# Patient Record
Sex: FEMALE | Race: WHITE
[De-identification: names, ages, dates, MRNs, and addresses within clinical notes are randomized per-mention and may not be internally consistent; named-entity substitution may affect disease eponyms.]

---

## 2019-01-14 ENCOUNTER — HOSPITAL ENCOUNTER (OUTPATIENT)
Dept: HOSPITAL 97 - ER | Age: 79
Setting detail: OBSERVATION
LOS: 2 days | Discharge: HOME | End: 2019-01-16
Attending: FAMILY MEDICINE | Admitting: FAMILY MEDICINE
Payer: COMMERCIAL

## 2019-01-14 DIAGNOSIS — E03.9: ICD-10-CM

## 2019-01-14 DIAGNOSIS — K43.9: ICD-10-CM

## 2019-01-14 DIAGNOSIS — I10: ICD-10-CM

## 2019-01-14 DIAGNOSIS — Z79.82: ICD-10-CM

## 2019-01-14 DIAGNOSIS — E78.5: ICD-10-CM

## 2019-01-14 DIAGNOSIS — R11.2: Primary | ICD-10-CM

## 2019-01-14 DIAGNOSIS — H91.90: ICD-10-CM

## 2019-01-14 DIAGNOSIS — E66.01: ICD-10-CM

## 2019-01-14 LAB
ALBUMIN SERPL BCP-MCNC: 3.2 G/DL (ref 3.4–5)
ALP SERPL-CCNC: 82 U/L (ref 45–117)
ALT SERPL W P-5'-P-CCNC: 17 U/L (ref 12–78)
ANISOCYTOSIS BLD QL: (no result)
AST SERPL W P-5'-P-CCNC: 14 U/L (ref 15–37)
BUN BLD-MCNC: 18 MG/DL (ref 7–18)
GLUCOSE SERPLBLD-MCNC: 136 MG/DL (ref 74–106)
HCT VFR BLD CALC: 30.5 % (ref 36–45)
HCT VFR BLD CALC: 33.4 % (ref 36–45)
HCT VFR BLD CALC: 35 % (ref 36–45)
HYPOCHROMIA BLD QL: (no result)
INR BLD: 1.08
LIPASE SERPL-CCNC: 250 U/L (ref 73–393)
LYMPHOCYTES # SPEC AUTO: 0.5 K/UL (ref 0.7–4.9)
MAGNESIUM SERPL-MCNC: 2.5 MG/DL (ref 1.8–2.4)
MORPHOLOGY BLD-IMP: (no result)
NT-PROBNP SERPL-MCNC: 97 PG/ML (ref ?–450)
OVALOCYTES BLD QL SMEAR: (no result)
PMV BLD: 8 FL (ref 7.6–11.3)
POTASSIUM SERPL-SCNC: 4.1 MMOL/L (ref 3.5–5.1)
RBC # BLD: 4.78 M/UL (ref 3.86–4.86)
TROPONIN (EMERG DEPT USE ONLY): < 0.02 NG/ML (ref 0–0.04)

## 2019-01-14 PROCEDURE — 51702 INSERT TEMP BLADDER CATH: CPT

## 2019-01-14 PROCEDURE — 81003 URINALYSIS AUTO W/O SCOPE: CPT

## 2019-01-14 PROCEDURE — 85025 COMPLETE CBC W/AUTO DIFF WBC: CPT

## 2019-01-14 PROCEDURE — 93005 ELECTROCARDIOGRAM TRACING: CPT

## 2019-01-14 PROCEDURE — 36415 COLL VENOUS BLD VENIPUNCTURE: CPT

## 2019-01-14 PROCEDURE — 94760 N-INVAS EAR/PLS OXIMETRY 1: CPT

## 2019-01-14 PROCEDURE — 99285 EMERGENCY DEPT VISIT HI MDM: CPT

## 2019-01-14 PROCEDURE — 96366 THER/PROPH/DIAG IV INF ADDON: CPT

## 2019-01-14 PROCEDURE — 85018 HEMOGLOBIN: CPT

## 2019-01-14 PROCEDURE — 85610 PROTHROMBIN TIME: CPT

## 2019-01-14 PROCEDURE — 83690 ASSAY OF LIPASE: CPT

## 2019-01-14 PROCEDURE — 80076 HEPATIC FUNCTION PANEL: CPT

## 2019-01-14 PROCEDURE — 83735 ASSAY OF MAGNESIUM: CPT

## 2019-01-14 PROCEDURE — 74176 CT ABD & PELVIS W/O CONTRAST: CPT

## 2019-01-14 PROCEDURE — 85014 HEMATOCRIT: CPT

## 2019-01-14 PROCEDURE — 84484 ASSAY OF TROPONIN QUANT: CPT

## 2019-01-14 PROCEDURE — 96365 THER/PROPH/DIAG IV INF INIT: CPT

## 2019-01-14 PROCEDURE — 96375 TX/PRO/DX INJ NEW DRUG ADDON: CPT

## 2019-01-14 PROCEDURE — 87088 URINE BACTERIA CULTURE: CPT

## 2019-01-14 PROCEDURE — 83880 ASSAY OF NATRIURETIC PEPTIDE: CPT

## 2019-01-14 PROCEDURE — 87086 URINE CULTURE/COLONY COUNT: CPT

## 2019-01-14 PROCEDURE — 71045 X-RAY EXAM CHEST 1 VIEW: CPT

## 2019-01-14 PROCEDURE — 80048 BASIC METABOLIC PNL TOTAL CA: CPT

## 2019-01-14 PROCEDURE — 96361 HYDRATE IV INFUSION ADD-ON: CPT

## 2019-01-14 RX ADMIN — DEXTROSE AND SODIUM CHLORIDE SCH MLS: 5; .45 INJECTION, SOLUTION INTRAVENOUS at 18:01

## 2019-01-14 RX ADMIN — Medication SCH ML: at 20:36

## 2019-01-14 RX ADMIN — DEXTROSE AND SODIUM CHLORIDE SCH: 5; .45 INJECTION, SOLUTION INTRAVENOUS at 17:00

## 2019-01-14 RX ADMIN — SODIUM CHLORIDE SCH: 0.9 INJECTION, SOLUTION INTRAVENOUS at 15:00

## 2019-01-14 NOTE — RAD REPORT
EXAM DESCRIPTION:  CT - Abdomen   Pelvis Wo Contrast - 1/14/2019 3:01 pm

 

CLINICAL HISTORY:  Abdominal  pain vomiting

 

COMPARISON:  2010

 

TECHNIQUE:  Computed axial tomography of the abdomen and pelvis was obtained. IV and oral contrast we
re not requested.

 

All CT scans are performed using dose optimization technique as appropriate and may include automated
 exposure control or mA/KV adjustment according to patient size.

 

FINDINGS:   The evaluation of solid organs, vessels and bowel is limited secondary to the lack of con
trast administration.

 

A very large ventral hernia contains stomach, large and small bowel and proximal pancreas. The neck m
easures centimeters.

 

Remainder of the liver, spleen, pancreas and kidneys appear grossly normal. Small adrenal adenomas ar
e present. There is no evidence of diverticulitis.

 

A Davenport catheter is present within the bladder. Rectum is mildly distended with stool.

.

Gallstones are present.

 

IMPRESSION:  Very large ventral hernia

 

Cholelithiasis

## 2019-01-14 NOTE — EKG
Test Date:    2019-01-14               Test Time:    12:58:09

Technician:   CATHY                                     

                                                     

MEASUREMENT RESULTS:                                       

Intervals:                                           

Rate:         73                                     

IA:           212                                    

QRSD:         142                                    

QT:           438                                    

QTc:          482                                    

Axis:                                                

P:            78                                     

IA:           212                                    

QRS:          34                                     

T:            -2                                     

                                                     

INTERPRETIVE STATEMENTS:                                       

                                                     

Sinus rhythm with 1st degree AV block

Right bundle branch block

Abnormal ECG

Compared to ECG 06/04/2010 09:15:00

First degree AV block now present

T-wave abnormality no longer present

Possible ischemia no longer present



Electronically Signed On 01-14-19 15:05:50 CST by Shady Cabral

## 2019-01-14 NOTE — ER
Nurse's Notes                                                                                     

 [facility]                                                                

Name: Sindhu Rai                                                                              

Age: 78 yrs                                                                                       

Sex: Female                                                                                       

: 1940                                                                                   

MRN: [id number]                                                                                   

Arrival Date: 2019                                                                          

Time: 12:18                                                                                       

Account#: [id number]                                                                            

Bed 7                                                                                             

Private MD:                                                                                       

Diagnosis: Gastrointestinal hemorrhage, unspecified;Vomiting;Ventral hernia;Anemia,               

  unspecified;Obesity, unspecified;Cholelithiasis                                                 

                                                                                                  

Presentation:                                                                                     

                                                                                             

12:19 Presenting complaint: EMS states: N/V for a couple days, vomited 4 times today with     jl7 

      brownish/pink emesis. Transition of care: patient was received from another setting of      

      care (long-[facility] care [facility]), [facility]. Onset of symptoms was 2019. Risk Assessment: Do you want to hurt yourself or someone else? Patient            

      reports no desire to harm self or others. Initial Sepsis Screen: Does the patient meet      

      any 2 criteria? No. Patient's initial sepsis screen is negative. Does the patient have      

      a suspected source of infection? No. Patient's initial sepsis screen is negative. Care      

      prior to arrival: None.                                                                     

12:19 Method Of Arrival: EMS: [facility]                                                       jl7 

12:19 Acuity: JANIE 3                                                                           jl7 

                                                                                                  

Triage Assessment:                                                                                

12:32 General: Appears in no apparent distress. uncomfortable, Behavior is calm, cooperative, jl7 

      appropriate for age. Pain: Complains of pain in diaphragm Pain does not radiate. Pain       

      currently is 2 out of 10 on a pain scale. Quality of pain is described as "Sore" Is         

      continuous. EENT: No signs and/or symptoms were reported regarding the EENT system.         

      Neuro: Level of Consciousness is awake, alert, obeys commands, Oriented to person,          

      place, time, situation. Cardiovascular: Heart tones S1 S2 present. Respiratory: Airway      

      is patent Respiratory effort is even, unlabored, Respiratory pattern is regular,            

      symmetrical, Breath sounds are clear bilaterally. GI: Reports nausea, vomiting, Patient     

      currently denies diarrhea. : No signs and/or symptoms were reported regarding the         

      genitourinary system. Derm: Skin is pink, warm \T\ dry. Musculoskeletal: No signs and/or    

      symptoms reported regarding the musculoskeletal system.                                     

                                                                                                  

Historical:                                                                                       

- Allergies:                                                                                      

12:32 Codeine;                                                                                jl7 

12:32 Potato;                                                                                 jl7 

12:32 Rice;                                                                                   jl7 

- Home Meds:                                                                                      

12:32 aspirin 81 mg Oral TbEC 1 tab once daily [Active]; docusate sodium 100 mg Oral cap 1    jl7 

      cap 2 times per day [Active]; furosemide 20 mg Oral tab 1 tab 2 times per day [Active];     

      ipratropium bromide inhalation inhalation [Active]; Klor-Con M20 20 mEq Oral TbTQ 1 tab     

      once daily [Active]; levothyroxine 25 mcg tab 1 tab once daily [Active]; magnesium          

      oxide 400 mg Oral tab [Active]; metoclopramide HCl 10 mg Oral tab 1 tab 4 times per day     

      [Active]; metoprolol tartrate 25 mg Oral tab 1 tab once daily [Active]; omeprazole 40       

      mg Oral cpDR 1 cap once daily [Active]; Phenergan 25 mg Rectal supp [Active]; Phenergan     

      Oral [Active]; quetiapine 50 mg oral tab [Active]; sennosides 8.6 mg oral tab 1 tabs        

      BID [Active]; simvastatin 40 mg Oral tab 1 tab once daily [Active]; spironolactone 25       

      mg Oral tab 1 tab once daily [Active]; sucralfate 1 gram Oral tab 1 tab 2 times per day     

      [Active]; Vitamin D Oral [Active];                                                          

- PMHx:                                                                                           

12:32 psychosis; Hypothyroidism; Hyperlipidemia; Hypertension; Ventral Hernia;                jl7 

                                                                                                  

- Immunization history:: Adult Immunizations up to date.                                          

- Social history:: Smoking status: Patient/guardian denies using tobacco.                         

- Ebola Screening: : No symptoms or risks identified at this time.                                

- Family history:: not pertinent.                                                                 

                                                                                                  

                                                                                                  

Screenin:30 Abuse screen: Denies threats or abuse. Denies injuries from another. Nutritional        jl7 

      screening: No deficits noted. Tuberculosis screening: No symptoms or risk factors           

      identified.                                                                                 

12:30 Fall Risk No fall in past 12 months (0 pts). No secondary diagnosis (0 pts). IV access  jl7 

      (20 points). Ambulatory Aid- Crutches/Cane/Walker (15 pts). Gait- Weak (10 pts.).           

      Mental Status- Oriented to own ability (0 pts). Total Aguero Fall Scale indicates Low        

      Risk Score (25-44 pts). Fall prevention measures have been instituted. Side Rails Up X      

      2 Placed close to Nursing Station Frequent Obs/Assesments occuring As available Patient     

      and Family Educated on Fall Prevention Program and strategies.                              

                                                                                                  

Assessment:                                                                                       

12:30 General: See triage assessment. GI: Pt reports "I haven't even vomited since they       jl7 

      picked me up to bring me here.".                                                            

13:30 Reassessment: Patient appears in no apparent distress at this time. No changes from     [facility] 

      previously documented assessment. Patient and/or family updated on plan of care and         

      expected duration. Pain level reassessed. Patient is alert, oriented x 3, equal             

      unlabored respirations, skin warm/dry/pink.                                                 

14:25 Reassessment: Dr. Mendes at bedside discussing plan of care.                          jl7 

14:30 Reassessment: Patient appears in no apparent distress at this time. Patient and/or      jl7 

      family updated on plan of care and expected duration. Pain level reassessed. Patient is     

      alert, oriented x 3, equal unlabored respirations, skin warm/dry/pink.                      

15:15 Reassessment: Dr. Lofton at bedside discussing plan of care. Gastro-occult test sent to  [facility] 

      lab at this time.                                                                           

                                                                                                  

Vital Signs:                                                                                      

12:19  / 95; Pulse 75; Resp 18 S; Temp 98.8(O); Pulse Ox 97% on R/A; Weight 149.69 kg   jl7 

      (R); Height 5 ft. 3 in. (160.02 cm) (R); Pain 2/10;                                         

13:00  / 97; Pulse 75; Resp 16; Pulse Ox 98% on R/A;                                    jl7 

14:00  / 77; Pulse 76; Resp 16 S; Pulse Ox 96% on R/A;                                  jl7 

15:00  / 76; Pulse 75; Resp 18 S; Pulse Ox 96% on R/A;                                  jl7 

15:33  / 86; Pulse 80; Resp 16 S; Pulse Ox 96% on R/A;                                  jl7 

17:12  / 82; Pulse 78; Resp 16 S; Pulse Ox 99% on R/A;                                  jl7 

12:19 Body Mass Index 58.46 (149.69 kg, 160.02 cm)                                            [facility] 

                                                                                                  

ED Course:                                                                                        

12:18 Patient arrived in ED.                                                                  jl7 

12:19 Arm band placed on right wrist.                                                         jl7 

12:20 Triage completed.                                                                       jl7 

12:30 Patient has correct armband on for positive identification. Placed in gown. Bed in low  jl7 

      position. Call light in reach. Side rails up X2. Cardiac monitor on. Pulse ox on. NIBP      

      on. Warm blanket given.                                                                     

12:35 Jesus Mendes MD is Attending Physician.                                             [facility] 

12:55 Davenport cath inserted, using sterile technique, 16 Fr., by me, balloon inflated, to       jl7 

      gravity drainage, urine specimen collected. Missed attempt(s): 22 gauge in left             

      forearm. Bleeding controlled, band aid applied, catheter tip intact.                        

13:05 EKG done, by EKG tech. reviewed by Jesus Mendes MD.                                   sm3 

13:10 Missed attempt(s): 22 gauge in right forearm. Bleeding controlled, band aid applied,    jl7 

      catheter tip intact.                                                                        

13:19 Missed attempt(s): 24 gauge in right hand. Bleeding controlled, band aid applied,       jl7 

      catheter tip intact.                                                                        

13:20 Initial lab(s) drawn, by me, sent to lab. Inserted saline lock: 20 gauge in right       aa5 

      antecubital area, using aseptic technique. Blood collected.                                 

13:27 Anton, Jahala, RN is Primary Nurse.                                                      [facility] 

13:36 X-ray completed. Portable x-ray completed in exam room. Patient tolerated procedure     jb2 

      well.                                                                                       

13:41 XRAY Chest (1 view) In Process Unspecified.                                             EDMS

14:48 Dawood Lofton MD is Hospitalizing Provider.                                            [facility] 

15:00 CT completed. Patient tolerated procedure well. Patient moved to CT via stretcher.      jg6 

      Patient moved back from CT.                                                                 

15:02 CT Abd/Pelvis - Without Cont In Process Unspecified.                                    EDMS

17:16 No provider procedures requiring assistance completed. Patient admitted, IV remains in  jl7 

      place. intact, No redness/swelling at site.                                                 

                                                                                                  

Administered Medications:                                                                         

13:20 Drug: NS 0.9% 1000 ml Route: IV; Rate: 125 ml/hr; Site: right antecubital;              ca1 

17:13 Follow up: IV Status: Completed infusion; Infusion continued upon admission             jl7 

13:28 Drug: Zofran 4 mg Route: IVP; Site: right antecubital;                                  ca1 

13:45 Follow up: Response: No adverse reaction; Nausea is decreased                           jl7 

14:56 Drug: ProTONIX 80 mg Route: IVP; Site: right antecubital;                               jl7 

15:28 Follow up: Response: No adverse reaction                                                7 

15:20 Drug: ProTONIX 8 mg/hr Route: IV; Rate: 25 ml/hr; Site: right antecubital;              jl7 

17:13 Follow up: IV Status: Completed infusion; Infusion continued upon admission             jl7 

                                                                                                  

                                                                                                  

Outcome:                                                                                          

14:51 Decision to Hospitalize by Provider.                                                    gianni 

17:15 Admitted to Med/surg accompanied by tech, family with patient, via stretcher, room 232, jl7 

      with chart, Report called to  NAOMIE Ward                                                   

17:15 Condition: stable                                                                           

17:15 Discharge instructions given to patient, family, Instructed on the need for admit,          

      Demonstrated understanding of instructions.                                                 

17:16 Patient left the ED.                                                                    jl7 

                                                                                                  

Signatures:                                                                                       

Dispatcher [employer]                           EDJesus Vo MD MD cha Buechter, Jesse jb2 Calderon, Audri, RN                     RN   aa5                                                  

Rafaela Anton RN                        RN   jl7                                                  

June Wu Jessica jg6                                                  

Angie Hinojosa RN                        RN   ca1                                                  

                                                                                                  

**************************************************************************************************

## 2019-01-14 NOTE — RAD REPORT
EXAM DESCRIPTION:  Yaima Single View1/14/2019 1:39 pm

 

CLINICAL HISTORY:  Cough

 

COMPARISON:  none

 

FINDINGS:   The lungs appear clear of acute infiltrate. The heart is mildly to moderately enlarged

 

IMPRESSION:   No acute abnormalities displayed

## 2019-01-14 NOTE — EDPHYS
Physician Documentation                                                                           

 [facility]                                                                

Name: Sindhu Rai                                                                              

Age: 78 yrs                                                                                       

Sex: Female                                                                                       

: 1940                                                                                   

MRN: [id number]                                                                                   

Arrival Date: 2019                                                                          

Time: 12:18                                                                                       

Account#: [id number]                                                                            

Bed 7                                                                                             

Private MD:                                                                                       

ED Physician Jesus Mendes                                                                      

HPI:                                                                                              

                                                                                             

14:40 This 78 yrs old  Female presents to ER via EMS with complaints of              gianni 

      Nausea/Vomiting.                                                                            

14:40 The patient presents to the emergency department with nausea, vomiting, that is         gianni 

      continuous. Onset: The symptoms/episode began/occurred 2 day(s) ago. Possible causes:       

      unknown. The symptoms are aggravated by nothing. The symptoms are alleviated by             

      nothing. Associated signs and symptoms: The patient has no apparent associated signs or     

      symptoms. Severity of symptoms: At their worst the symptoms were mild moderate in the       

      emergency department the symptoms are unchanged. The patient has not experienced            

      similar symptoms in the past.                                                               

                                                                                                  

Historical:                                                                                       

- Allergies:                                                                                      

12:32 Codeine;                                                                                jl7 

12:32 Potato;                                                                                 jl7 

12:32 Rice;                                                                                   jl7 

- Home Meds:                                                                                      

12:32 aspirin 81 mg Oral TbEC 1 tab once daily [Active]; docusate sodium 100 mg Oral cap 1    jl7 

      cap 2 times per day [Active]; furosemide 20 mg Oral tab 1 tab 2 times per day [Active];     

      ipratropium bromide inhalation inhalation [Active]; Klor-Con M20 20 mEq Oral TbTQ 1 tab     

      once daily [Active]; levothyroxine 25 mcg tab 1 tab once daily [Active]; magnesium          

      oxide 400 mg Oral tab [Active]; metoclopramide HCl 10 mg Oral tab 1 tab 4 times per day     

      [Active]; metoprolol tartrate 25 mg Oral tab 1 tab once daily [Active]; omeprazole 40       

      mg Oral cpDR 1 cap once daily [Active]; Phenergan 25 mg Rectal supp [Active]; Phenergan     

      Oral [Active]; quetiapine 50 mg oral tab [Active]; sennosides 8.6 mg oral tab 1 tabs        

      BID [Active]; simvastatin 40 mg Oral tab 1 tab once daily [Active]; spironolactone 25       

      mg Oral tab 1 tab once daily [Active]; sucralfate 1 gram Oral tab 1 tab 2 times per day     

      [Active]; Vitamin D Oral [Active];                                                          

- PMHx:                                                                                           

12:32 psychosis; Hypothyroidism; Hyperlipidemia; Hypertension; Ventral Hernia;                jl7 

                                                                                                  

- Immunization history:: Adult Immunizations up to date.                                          

- Social history:: Smoking status: Patient/guardian denies using tobacco.                         

- Ebola Screening: : No symptoms or risks identified at this time.                                

- Family history:: not pertinent.                                                                 

                                                                                                  

                                                                                                  

ROS:                                                                                              

14:40 Constitutional: Negative for fever, chills, and weight loss, Eyes: Negative for injury, gianni 

      pain, redness, and discharge, ENT: Negative for injury, pain, and discharge, Neck:          

      Negative for injury, pain, and swelling, Cardiovascular: Negative for chest pain,           

      palpitations, and edema, Respiratory: Negative for shortness of breath, cough,              

      wheezing, and pleuritic chest pain, Back: Negative for injury and pain, : Negative        

      for injury, bleeding, discharge, and swelling, MS/Extremity: Negative for injury and        

      deformity, Skin: Negative for injury, rash, and discoloration, Neuro: Negative for          

      headache, weakness, numbness, tingling, and seizure, Psych: Negative for depression,        

      anxiety, suicide ideation, homicidal ideation, and hallucinations, Allergy/Immunology:      

      Negative for hives, rash, and allergies, Endocrine: Negative for neck swelling,             

      polydipsia, polyuria, polyphagia, and marked weight changes, Hematologic/Lymphatic:         

      Negative for swollen nodes, abnormal bleeding, and unusual bruising.                        

14:40 Abdomen/GI: Positive for nausea, vomiting, coffee emesis.                                   

                                                                                                  

Exam:                                                                                             

14:40 Constitutional:  This is a well developed, well nourished patient who is awake, alert,  gianni 

      and in no acute distress. Head/Face:  Normocephalic, atraumatic. Eyes:  Pupils equal        

      round and reactive to light, extra-ocular motions intact.  Lids and lashes normal.          

      Conjunctiva and sclera are non-icteric and not injected.  Cornea within normal limits.      

      Periorbital areas with no swelling, redness, or edema. ENT:  Nares patent. No nasal         

      discharge, no septal abnormalities noted.  Tympanic membranes are normal and external       

      auditory canals are clear.  Oropharynx with no redness, swelling, or masses, exudates,      

      or evidence of obstruction, uvula midline.  Mucous membranes moist. Neck:  Trachea          

      midline, no thyromegaly or masses palpated, and no cervical lymphadenopathy.  Supple,       

      full range of motion without nuchal rigidity, or vertebral point tenderness.  No            

      Meningismus. Chest/axilla:  Normal chest wall appearance and motion.  Nontender with no     

      deformity.  No lesions are appreciated. Cardiovascular:  Regular rate and rhythm with a     

      normal S1 and S2.  No gallops, murmurs, or rubs.  Normal PMI, no JVD.  No pulse             

      deficits. Respiratory:  Lungs have equal breath sounds bilaterally, clear to                

      auscultation and percussion.  No rales, rhonchi or wheezes noted.  No increased work of     

      breathing, no retractions or nasal flaring. Back:  No spinal tenderness.  No                

      costovertebral tenderness.  Full range of motion. Female :  Normal external               

      genitalia. MS/ Extremity:  Pulses equal, no cyanosis.  Neurovascular intact.  Full,         

      normal range of motion. Neuro:  Awake and alert, GCS 15, oriented to person, place,         

      time, and situation.  Cranial nerves II-XII grossly intact.  Motor strength 5/5 in all      

      extremities.  Sensory grossly intact.  Cerebellar exam normal.  Normal gait. Psych:         

      Awake, alert, with orientation to person, place and time.  Behavior, mood, and affect       

      are within normal limits.                                                                   

14:40 Abdomen/GI: Bowel sounds: normal, Palpation: mild abdominal tenderness, large hernia,       

      ventral, Liver: no appreciated palpable abnormalities, Hernia: noted in the  epigastric     

      area, paraumbilical area and umbilical area.                                                

                                                                                                  

Vital Signs:                                                                                      

12:19  / 95; Pulse 75; Resp 18 S; Temp 98.8(O); Pulse Ox 97% on R/A; Weight 149.69 kg   7 

      (R); Height 5 ft. 3 in. (160.02 cm) (R); Pain 2/10;                                         

13:00  / 97; Pulse 75; Resp 16; Pulse Ox 98% on R/A;                                    jl7 

14:00  / 77; Pulse 76; Resp 16 S; Pulse Ox 96% on R/A;                                  jl7 

15:00  / 76; Pulse 75; Resp 18 S; Pulse Ox 96% on R/A;                                  jl7 

15:33  / 86; Pulse 80; Resp 16 S; Pulse Ox 96% on R/A;                                  jl7 

17:12  / 82; Pulse 78; Resp 16 S; Pulse Ox 99% on R/A;                                  jl7 

12:19 Body Mass Index 58.46 (149.69 kg, 160.02 cm)                                            [facility] 

                                                                                                  

MDM:                                                                                              

12:35 Patient medically screened.                                                             gianni 

14:32 Patient medically screened.                                                             [facility] 

14:43 Data reviewed: vital signs, nurses notes, lab test result(s), EKG, radiologic studies,  [facility] 

      CT scan, plain films.                                                                       

                                                                                                  

                                                                                             

12:36 Order name: Basic Metabolic Panel; Complete Time: 14:38                                 [facility] 

                                                                                             

12:36 Order name: CBC with Diff; Complete Time: 17:15                                         [facility] 

                                                                                             

12:36 Order name: LFT's; Complete Time: 14:38                                                 [facility] 

                                                                                             

12:36 Order name: Magnesium; Complete Time: 14:38                                             [facility] 

                                                                                             

12:36 Order name: NT PRO-BNP; Complete Time: 14:38                                            [facility] 

                                                                                             

12:36 Order name: PT-INR; Complete Time: 14:38                                                [facility] 

                                                                                             

12:36 Order name: Troponin (emerg Dept Use Only); Complete Time: 14:38                        [facility] 

                                                                                             

12:36 Order name: Lipase; Complete Time: 14:38                                                [facility] 

                                                                                             

12:36 Order name: Urine Culture                                                               [facility] 

                                                                                             

13:03 Order name: Urine Dipstick--Ancillary (enter results); Complete Time: 17:15               

                                                                                             

15:27 Order name: Occult Blood                                                                jl7 

                                                                                             

16:00 Order name: Manual Differential; Complete Time: 17:15                                   [facility]

                                                                                             

16:04 Order name: Basic Metabolic Panel                                                       [facility]

                                                                                             

16:04 Order name: Basic Metabolic Panel                                                       [facility]

                                                                                             

12:36 Order name: XRAY Chest (1 view); Complete Time: 14:38                                   [facility] 

                                                                                             

14:44 Order name: CT Abd/Pelvis - Without Cont; Complete Time: 17:15                          [facility] 

                                                                                             

16:04 Order name: CBC with Automated Diff                                                     [facility]

                                                                                             

16:04 Order name: CBC with Automated Diff                                                     [facility]

                                                                                             

16:04 Order name: Hematocrit                                                                  [facility]

                                                                                             

16:04 Order name: Hematocrit                                                                  [facility]

                                                                                             

16:04 Order name: Hematocrit                                                                  [facility]

                                                                                             

16:04 Order name: Hematocrit                                                                  [facility]

                                                                                             

16:04 Order name: Hemoglobin                                                                  [facility]

                                                                                             

16:04 Order name: Hemoglobin                                                                  [facility]

                                                                                             

16:04 Order name: Hemoglobin                                                                  [facility]

                                                                                             

16:04 Order name: Hemoglobin                                                                  [facility]

                                                                                             

12:36 Order name: EKG; Complete Time: 12:37                                                                                                                                                

12:36 Order name: Cardiac monitoring; Complete Time: 13:                                    [facility] 

                                                                                             

12:36 Order name: EKG - Nurse/Tech; Complete Time: :                                      [facility] 

                                                                                             

12:36 Order name: IV Saline Lock; Complete Time: :                                        [facility] 

                                                                                             

12:36 Order name: Labs collected and sent; Complete Time: 13:                               gianni 

                                                                                             

12:36 Order name: O2 Per Protocol; Complete Time: :                                       [facility] 

                                                                                             

12:36 Order name: O2 Sat Monitoring; Complete Time: :                                     [facility] 

                                                                                             

12:36 Order name: Urine Dipstick-Ancillary (obtain specimen); Complete Time: :            [facility] 

                                                                                             

12:38 Order name: [place]; Complete Time: :                                                 [facility] 

                                                                                             

14:39 Order name: Gastrocult; Complete Time: 15:21                                            [facility] 

                                                                                             

16:04 Order name: CONS Pharmacy Consult                                                       [facility]

                                                                                             

16:04 Order name: CONS Physician Consult                                                      [facility]

                                                                                             

16:04 Order name: NPO                                                                         [facility]

                                                                                                  

Administered Medications:                                                                         

13:20 Drug: NS 0.9% 1000 ml Route: IV; Rate: 125 ml/hr; Site: right antecubital;              ca1 

17:13 Follow up: IV Status: Completed infusion; Infusion continued upon admission             jl7 

13:28 Drug: Zofran 4 mg Route: IVP; Site: right antecubital;                                  ca1 

13:45 Follow up: Response: No adverse reaction; Nausea is decreased                           jl7 

14:56 Drug: ProTONIX 80 mg Route: IVP; Site: right antecubital;                               jl7 

15:28 Follow up: Response: No adverse reaction                                                jl7 

15:20 Drug: ProTONIX 8 mg/hr Route: IV; Rate: 25 ml/hr; Site: right antecubital;              jl7 

17:13 Follow up: IV Status: Completed infusion; Infusion continued upon admission             jl7 

                                                                                                  

                                                                                                  

Disposition:                                                                                      

19 14:51 Hospitalization ordered by Dawood Lofton for Inpatient Admission. Preliminary      

  diagnosis are Gastrointestinal hemorrhage, unspecified, Vomiting, Ventral                       

  hernia, Anemia, unspecified, Obesity, unspecified, Cholelithiasis.                              

- Bed requested for Telemetry/MedSurg (Inpatient).                                                

- Status is Inpatient Admission.                                                              jl7 

- Condition is Fair.                                                                              

- Problem is new.                                                                                 

- Symptoms are unchanged.                                                                         

UTI on Admission? No                                                                              

                                                                                                  

                                                                                                  

                                                                                                  

Signatures:                                                                                       

Dispatcher [employer]                           EDMS                                                 

Yusra Winslow                              Jesus Shea MD MD cha Leal, Jahala RN                        RN   jl7                                                  

Angie Hinojosa RN                        RN   ca1                                                  

                                                                                                  

Corrections: (The following items were deleted from the chart)                                    

16:56 14:51 Hospitalization Ordered by Dawood Lofton MD for Inpatient Admission. Preliminary   bd  

      diagnosis is Gastrointestinal hemorrhage, unspecified; Vomiting; Ventral hernia;            

      Anemia, unspecified; Obesity, unspecified. Bed requested for Telemetry/MedSurg              

      (Inpatient). Status is Inpatient Admission. Condition is Fair. Problem is new. Symptoms     

      are unchanged. UTI on Admission? No. gianni                                                    

17:16 16:56 2019 14:51 Hospitalization Ordered by Dawood Lofton MD for Inpatient         [facility] 

      Admission. Preliminary diagnosis is Gastrointestinal hemorrhage, unspecified; Vomiting;     

      Ventral hernia; Anemia, unspecified; Obesity, unspecified. Bed requested for                

      Telemetry/MedSurg (Inpatient). Status is Inpatient Admission. Condition is Fair.            

      Problem is new. Symptoms are unchanged. UTI on Admission? No. bd                            

17:16 17:16 2019 14:51 Hospitalization Ordered by Dawood Lofton MD for Inpatient         jl7 

      Admission. Preliminary diagnosis is Gastrointestinal hemorrhage, unspecified; Vomiting;     

      Ventral hernia; Anemia, unspecified; Obesity, unspecified; Cholelithiasis. Bed              

      requested for Telemetry/MedSurg (Inpatient). Status is Inpatient Admission. Condition       

      is Fair. Problem is new. Symptoms are unchanged. UTI on Admission? No. gianni                  

                                                                                                  

**************************************************************************************************

## 2019-01-15 LAB
BUN BLD-MCNC: 16 MG/DL (ref 7–18)
GLUCOSE SERPLBLD-MCNC: 112 MG/DL (ref 74–106)
HCT VFR BLD CALC: 29.8 % (ref 36–45)
HCT VFR BLD CALC: 31.6 % (ref 36–45)
LYMPHOCYTES # SPEC AUTO: 1.2 K/UL (ref 0.7–4.9)
MORPHOLOGY BLD-IMP: (no result)
PMV BLD: 8.4 FL (ref 7.6–11.3)
POTASSIUM SERPL-SCNC: 4.3 MMOL/L (ref 3.5–5.1)
RBC # BLD: 4.09 M/UL (ref 3.86–4.86)

## 2019-01-15 RX ADMIN — SODIUM CHLORIDE SCH MLS: 0.9 INJECTION, SOLUTION INTRAVENOUS at 22:36

## 2019-01-15 RX ADMIN — Medication SCH: at 21:00

## 2019-01-15 RX ADMIN — SODIUM CHLORIDE SCH MLS: 0.9 INJECTION, SOLUTION INTRAVENOUS at 12:38

## 2019-01-15 RX ADMIN — DEXTROSE AND SODIUM CHLORIDE SCH MLS: 5; .45 INJECTION, SOLUTION INTRAVENOUS at 05:15

## 2019-01-15 RX ADMIN — DEXTROSE AND SODIUM CHLORIDE SCH MLS: 5; .45 INJECTION, SOLUTION INTRAVENOUS at 22:36

## 2019-01-15 RX ADMIN — SODIUM CHLORIDE SCH MLS: 0.9 INJECTION, SOLUTION INTRAVENOUS at 01:16

## 2019-01-15 RX ADMIN — Medication SCH: at 09:00

## 2019-01-15 NOTE — HP
Date of Admission:  01/14/2019



Consultant:  Chase Michael M.D., with GI.



Code Status:  Full.



History Of Present Illness:  The patient is an 78-year-old female with past medical history of hypoth
yroidism, hyperlipidemia, hypertension, large ventral hernia, and psychosis.  The patient is a reside
nt of nursing home, who was brought in from the nursing home today for nausea and vomiting.  The tanner
ent had some coffee-grounds emesis.  The patient is on aspirin on a daily basis.  The patient is very
 hard of hearing, however, is able to read lips.  The patient is not a good historian as well.  Infor
mation was supplemented by previous records and hospital staff.  The patient's symptoms began 2 days 
ago.  No alleviating factors.  The patient's symptoms are constant, moderate, progressively worsening
.  The patient therefore was brought in to the ER for further evaluation.  Upon arrival, workup revea
led a creatinine of 1.33 which is above her baseline.  Hemoglobin was 10.6.  UA was negative.  CT sca
n of the abdomen and pelvis showed a very large ventral hernia and cholelithiasis.  The patient was t
hen referred for admission for gastrointestinal bleed, intractable nausea and vomiting.  Vital signs 
were stable.  The patient was not hypotensive or tachycardic.  The patient was seen in the ER.  She w
as awake, alert, and oriented x3.  Some mild distress.



Past Medical History:  Hypertension, hypothyroidism, hyperlipidemia, ventral hernia, and psychosis.



Allergies:  CODEINE.



Medications:  Medication list was reviewed.



Social History:  The patient is a resident of nursing home, , has children.



Family History:  Not pertinent in this 78-year-old female.



Review of Systems:

Limited due to the patient's medical condition, however, negative except as per HPI.



Physical Examination:

Vital Signs:  Blood pressure 140/95, pulse 75, respirations 18, temperature 98.8, O2 of 97% on room a
ir, and BMI 58. 

General:  Awake, alert, oriented x3.  Elderly female, in some mild distress. 

HEENT:  Normocephalic and atraumatic.  PERRLA.  EOMI.  Dry mucous membranes.  Oropharynx is clear.  P
oor dentition.  Conjunctivae anicteric. 

Neck:  Supple.  No JVD.  Trachea midline. 

CV:  S1, S2.  Regular rate and rhythm.  Peripheral pulses present. 

Respiratory:  Moving air well bilaterally.  No wheezing or stridor. 

Gastrointestinal:  Abdomen is soft, nontender, nondistended.  The patient does have a large ventral h
ernia which is somewhat reducible.  No guarding or rigidity.  Bowel sounds positive. 

Extremities:  No clubbing, cyanosis, or edema.  No calf tenderness. 

Neurologic:  Cranial nerves 2 through 12 intact grossly.  No focal neurological deficit.  Speech is n
ormal.  The patient has very poor hearing. 

Skin:  No rashes.



Laboratory Data:  UA is negative.  Sodium 141, potassium 4.1, chloride 104, CO2 of 31, BUN 18, creati
nine 1.33, glucose 136, calcium 8.7, magnesium 2.5.  INR 1.08.  WBC 10.1, H and H 10.6 and 35, platel
ets 289, and neutrophils 89%.  Abdomen and pelvis CT shows very large ventral hernia, cholelithiasis.
  Chest x-ray personally reviewed, shows no acute abnormalities.



Assessment:  An 78-year-old female with;

1.Acute gastrointestinal bleed.

2.Morbid obesity.

3.Intractable nausea and vomiting.

4.Large ventral hernia.

5.Essential hypertension.

6.Hypothyroidism.

7.Mixed hyperlipidemia.

8.Gastrointestinal and deep venous thrombosis prophylaxis addressed.



Plan:  Admit the patient to Med-Surg, place as observation.  We will monitor H and H q.4 hours x3.  T
ransfuse as needed.  CT scan of the abdomen and pelvis did not show any acute etiology of the bleed. 
 The patient is only on aspirin.  We will hold that for now.  We will start on IV fluids, n.p.o. afte
r midnight for a possible scope in a.m.  Clear liquids for now.  We will resume home medications as a
ppropriate.  We will start on PPI drip.  Follow up on gastric occult testing.





AIDEN

DD:  01/14/2019 16:28:14Voice ID:  [id number]

## 2019-01-16 RX ADMIN — SODIUM CHLORIDE SCH MLS: 0.9 INJECTION, SOLUTION INTRAVENOUS at 05:30

## 2019-01-16 RX ADMIN — DEXTROSE AND SODIUM CHLORIDE SCH: 5; .45 INJECTION, SOLUTION INTRAVENOUS at 09:00

## 2019-01-16 RX ADMIN — Medication SCH: at 09:00

## 2019-01-16 NOTE — P.SSS
Patient History


Date of Service: 01/16/19


Primary Care Provider: Dr. richardson ([facility])


Reason for admission: Nausea and vomiting


History of Present Illness: 





The patient is an 78-year-old female with past medical history of hypothyroidism

, hyperlipidemia, hypertension, large ventral hernia, and psychosis.  The 

patient is a resident of nursing home, who was brought in from the nursing home 

today for nausea and vomiting.  The patient had some coffee-grounds emesis.  

The patient is on aspirin on a daily basis.  The patient is very hard of hearing

, however, is able to read lips.  The patient is not a good historian as well.  

Information was supplemented by previous records and hospital staff.  The 

patient's symptoms began 2 days ago.  No alleviating factors.  The patient's 

symptoms are constant, moderate, progressively worsening.  The patient 

therefore was brought in to the ER for further evaluation.  Upon arrival, 

workup revealed a creatinine of 1.33 which is above her baseline.  Hemoglobin 

was 10.6.  UA was negative.  CT scan of the abdomen and pelvis showed a very 

large ventral hernia and cholelithiasis.  The patient was then referred for 

admission for gastrointestinal bleed, intractable nausea and vomiting.  Vital 

signs were stable.  The patient was not hypotensive or tachycardic.  The 

patient was seen in the ER.  She was awake, alert, and oriented x3.  Some mild 

distress.


Allergies





rice Allergy (Mild, Verified 01/14/19 17:54)


 constipation


codeine Allergy (Verified 01/14/19 17:54)


 Itching


potato Allergy (Verified 01/14/19 17:54)


 constipation





Home medications list reviewed: Yes


Home Medications: 








Aspirin [Ecotrin 81 MG] 81 mg PO DAILY 01/15/19 


Cholecalciferol (Vitamin D3) [Vitamin D3] 6,000 unit PO DAILY 01/15/19 


Docusate Sodium 100 mg PO BID 01/15/19 


Furosemide 2 tab PO DAILY 01/15/19 


Ipratropium Bromide Soln 0-03% 2 sprays NS SEECOM 01/15/19 


Ipratropium/Albuterol Sulfate [Iprat-Albut 0.5-3(2.5) mg/3 ml] 3 ml IH Q6HP PRN 

01/15/19 


Levothyroxine Sodium [Euthyrox] 25 mcg PO DAILY 01/15/19 


Magnesium Oxide [Magnesium] 400 mg PO DAILY 01/15/19 


Metoclopramide HCl [Reglan] 10 mg PO Q8HP PRN 01/15/19 


Metoprolol Tartrate 25 mg PO BID 01/15/19 


Omeprazole [Prilosec] 40 mg PO DAILY 01/15/19 


Potassium Chloride [Klor-Con M20] 2 tab PO DAILY 01/15/19 


Promethazine HCl 25 mg PO Q8HP PRN 01/15/19 


Promethazine Inj [Phenergan -Inj*] 25 mg IM Q8HP PRN 01/15/19 


Quetiapine [Seroquel*] 50 mg PO BEDTIME 01/15/19 


Sennosides 8.6 mg PO BID 01/15/19 


Simvastatin 40 mg PO BEDTIME 01/15/19 


Spironolactone [Aldactone*] 25 mg PO DAILY 01/15/19 


Sucralfate [Carafate*] 1 gm PO BID 01/15/19 








- Past Medical/Surgical History


Diabetic: No


-: htn


-: partial hysterectomy





- Family History


  ** Mother


-: Heart disease





  ** Father


-: Other (see notes)


Notes: cancer





- Social History


Smoking Status: Never smoker


Alcohol use: No


CD- Drugs: No


Caffeine use: Yes


Place of Residence: Nursing Home





Review of Systems


10-point ROS is otherwise unremarkable





Physical Examination





- Vital Signs


Temperature: 97.5 F


Blood Pressure: 120/68


Pulse: 971


Respirations: 20


Pulse Ox (%): 91





- Physical Exam


General: Alert, In no apparent distress, Oriented x3


HEENT: Atraumatic, PERRLA, Mucous membr. moist/pink, EOMI, Sclerae nonicteric


Neck: Supple, 2+ carotid pulse no bruit, No LAD, Without JVD or thyroid 

abnormality


Respiratory: Clear to auscultation bilaterally, Normal air movement


Cardiovascular: Regular rate/rhythm, Normal S1 S2


Gastrointestinal: Normal bowel sounds, No tenderness


Musculoskeletal: No tenderness


Integumentary: No rashes


Neurological: Normal gait, Normal speech, Normal strength at 5/5 x4 extr, 

Normal tone, Normal affect


Lymphatics: No axilla or inguinal lymphadenopathy





- Diagnosis (Problem(s))


(1) Acute GI bleeding


Current Visit: Yes   Status: Acute   





(2) Intractable nausea and vomiting


Current Visit: Yes   Status: Acute   





(3) Ventral hernia


Current Visit: Yes   Status: Acute   





(4) Hypothyroid


Current Visit: Yes   Status: Acute   


Qualifiers: 


   Hypothyroidism type: unspecified   Qualified Code(s): E03.9 - Hypothyroidism

, unspecified   





(5) Hyperlipidemia


Current Visit: No   Status: Acute   


Qualifiers: 


   Hyperlipidemia type: unspecified   Qualified Code(s): E78.5 - Hyperlipidemia

, unspecified   





(6) Hypertension


Current Visit: No   Status: Acute   


Qualifiers: 


   Hypertension type: essential hypertension   Qualified Code(s): I10 - 

Essential (primary) hypertension   


Treatment Summary: 


Patient was admitted for observation for possible GI bleed.  She was started on 

PPI drip and IV fluids. Her H&H remained stable, gastroenterologist was 

consulted.  Per gastroenterology, no evidence of GI bleed at this time as 

patient had a dental issue that was causing the bleeding.    Per GI evaluation, 

she was cleared for a regular diet.  She was then cleared for discharge from 

specialist point of view.  She was discharged back in a safe manner back to her 

nursing home at [place].


Prior to discharge, patient was symptom free, tolerating a regular diet, no 

complaints or evidence of bleeding noted.  She was hemodynamically stable and 

at baseline mentation wise.


No medication changes were made.


She was instructed to follow up outpatient with gastroenterology for a possible 

EGD if symptoms return.








- Disposition


Discharge Date: 01/16/19


Disposition: ROUTINE DISCHARGE


Condition: GOOD


Consultations: 


Dr. Michael, gastroenterology


Patient Discharge Instructions: Please follow up with your primary care 

physician in 1 week.  Please follow up with the gastroenterologist as an 

outpatient.  Information provided to you.  No medication changes made during 

this hospitalization.


Diet: AHA


Activity: Ad mandi


Time Spent Managing Pts Care (In Minutes): 45

## 2022-07-25 NOTE — P.PN
Subjective


Date of Service: 01/15/19





Patient seen and examined at bedside.  No family at bedside.  Chart reviewed 

and case discussed with nursing staff.


Very pleasant, seems to be confused.  Unsure of baseline.  She is a patient 

from [facility].





Review of Systems


10-point ROS is otherwise unremarkable





Physical Examination





- Vital Signs


Temperature: 97.3 F


Blood Pressure: 138/66


Pulse: 68


Respirations: 18


Pulse Ox (%): 90





- Physical Exam


General: In no apparent distress, Confused


HEENT: Atraumatic, PERRLA, EOMI


Neck: Supple, JVD not distended


Respiratory: Clear to auscultation bilaterally, Normal air movement


Cardiovascular: Regular rate/rhythm, Normal S1 S2


Gastrointestinal: Normal bowel sounds, No tenderness





- Studies


Laboratory Data (last 24 hrs)





01/14/19 13:15: WBC 10.1  D, Hgb 10.6 L, Hct 35.0 L D, Plt Count 289  D








Assessment And Plan





- Current Problems (Diagnosis)


(1) Acute GI bleeding


Current Visit: Yes   Status: Acute   





(2) Intractable nausea and vomiting


Current Visit: Yes   Status: Acute   





(3) Ventral hernia


Current Visit: Yes   Status: Acute   





(4) Hypothyroid


Current Visit: Yes   Status: Acute   


Qualifiers: 


   Hypothyroidism type: unspecified   Qualified Code(s): E03.9 - Hypothyroidism

, unspecified   





(5) Hyperlipidemia


Current Visit: No   Status: Acute   


Qualifiers: 


   Hyperlipidemia type: unspecified   Qualified Code(s): E78.5 - Hyperlipidemia

, unspecified   





(6) Hypertension


Current Visit: No   Status: Acute   


Qualifiers: 


   Hypertension type: essential hypertension   Qualified Code(s): I10 - 

Essential (primary) hypertension   





- Plan





An 78-year-old female with;





Acute gastrointestinal bleed 


Intractable nausea and vomiting


[place] (hard of hearing) (Acute) H91.90


Hyperlipidemia (Acute) E78.5


Hypertension (Acute) I10


Morbid obesity with BMI of 45.0-49.9, adult (Acute) Z68.42


Ventral hernia


Hypothyroidism





Plan:  [facility].  We will monitor H 

and H q.4 hours x3.  Transfuse as needed.  CT scan of the abdomen and pelvis 

did not show any acute etiology of the bleed.  Continue holding aspirin.   We 

will start on IV fluids, continue n.p.o., pending GI evaluation.  Continue PPI 

drip.  Follow up on gastric occult testing.





DVT prophylaxis:  Held at this time due to high risk of bleeding


GI prophylaxis:  Protonix drip


Diet:  NPO





Disposition:  Pending GI evaluation


Time Spent Managing PTS Care (In Minutes): 35
infant